# Patient Record
Sex: FEMALE | Race: BLACK OR AFRICAN AMERICAN | NOT HISPANIC OR LATINO | Employment: OTHER | URBAN - METROPOLITAN AREA
[De-identification: names, ages, dates, MRNs, and addresses within clinical notes are randomized per-mention and may not be internally consistent; named-entity substitution may affect disease eponyms.]

---

## 2023-09-30 ENCOUNTER — APPOINTMENT (OUTPATIENT)
Dept: RADIOLOGY | Facility: HOSPITAL | Age: 38
End: 2023-09-30
Payer: MEDICAID

## 2023-09-30 ENCOUNTER — HOSPITAL ENCOUNTER (EMERGENCY)
Facility: HOSPITAL | Age: 38
Discharge: HOME | End: 2023-10-01
Attending: STUDENT IN AN ORGANIZED HEALTH CARE EDUCATION/TRAINING PROGRAM
Payer: MEDICAID

## 2023-09-30 VITALS
DIASTOLIC BLOOD PRESSURE: 77 MMHG | RESPIRATION RATE: 20 BRPM | OXYGEN SATURATION: 100 % | WEIGHT: 205.03 LBS | SYSTOLIC BLOOD PRESSURE: 118 MMHG | TEMPERATURE: 97.9 F | BODY MASS INDEX: 37.5 KG/M2 | HEART RATE: 65 BPM

## 2023-09-30 DIAGNOSIS — M25.571 ACUTE BILATERAL ANKLE PAIN: ICD-10-CM

## 2023-09-30 DIAGNOSIS — M25.572 ACUTE BILATERAL ANKLE PAIN: ICD-10-CM
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
## 2023-09-30 DIAGNOSIS — M25.551 RIGHT HIP PAIN: ICD-10-CM

## 2023-09-30 PROCEDURE — 73552 X-RAY EXAM OF FEMUR 2/>: CPT | Mod: RIGHT SIDE | Performed by: STUDENT IN AN ORGANIZED HEALTH CARE EDUCATION/TRAINING PROGRAM

## 2023-09-30 PROCEDURE — 73502 X-RAY EXAM HIP UNI 2-3 VIEWS: CPT | Mod: RIGHT SIDE | Performed by: STUDENT IN AN ORGANIZED HEALTH CARE EDUCATION/TRAINING PROGRAM

## 2023-09-30 PROCEDURE — 99284 EMERGENCY DEPT VISIT MOD MDM: CPT | Performed by: STUDENT IN AN ORGANIZED HEALTH CARE EDUCATION/TRAINING PROGRAM

## 2023-09-30 PROCEDURE — 73502 X-RAY EXAM HIP UNI 2-3 VIEWS: CPT | Mod: RT,FY

## 2023-09-30 PROCEDURE — 73610 X-RAY EXAM OF ANKLE: CPT | Mod: RIGHT SIDE | Performed by: STUDENT IN AN ORGANIZED HEALTH CARE EDUCATION/TRAINING PROGRAM

## 2023-09-30 PROCEDURE — 73610 X-RAY EXAM OF ANKLE: CPT | Mod: LT,FY

## 2023-09-30 PROCEDURE — 2500000001 HC RX 250 WO HCPCS SELF ADMINISTERED DRUGS (ALT 637 FOR MEDICARE OP)

## 2023-09-30 PROCEDURE — 72170 X-RAY EXAM OF PELVIS: CPT | Mod: FY

## 2023-09-30 PROCEDURE — 73552 X-RAY EXAM OF FEMUR 2/>: CPT | Mod: LT

## 2023-09-30 PROCEDURE — 73610 X-RAY EXAM OF ANKLE: CPT | Mod: RT,FY

## 2023-09-30 RX ORDER — ACETAMINOPHEN 325 MG/1
TABLET ORAL
Status: COMPLETED
Start: 2023-09-30 | End: 2023-09-30

## 2023-09-30 RX ORDER — ACETAMINOPHEN 325 MG/1
650 TABLET ORAL ONCE
Status: COMPLETED | OUTPATIENT
Start: 2023-09-30 | End: 2023-09-30

## 2023-09-30 RX ADMIN — ACETAMINOPHEN 650 MG: 325 TABLET ORAL at 21:55

## 2023-09-30 RX ADMIN — ACETAMINOPHEN 650 MG: 325 TABLET, FILM COATED ORAL at 21:55

## 2023-09-30 ASSESSMENT — PAIN - FUNCTIONAL ASSESSMENT: PAIN_FUNCTIONAL_ASSESSMENT: 0-10

## 2023-09-30 ASSESSMENT — PAIN SCALES - GENERAL: PAINLEVEL_OUTOF10: 10 - WORST POSSIBLE PAIN

## 2023-09-30 ASSESSMENT — COLUMBIA-SUICIDE SEVERITY RATING SCALE - C-SSRS
2. HAVE YOU ACTUALLY HAD ANY THOUGHTS OF KILLING YOURSELF?: NO
6. HAVE YOU EVER DONE ANYTHING, STARTED TO DO ANYTHING, OR PREPARED TO DO ANYTHING TO END YOUR LIFE?: NO
1. IN THE PAST MONTH, HAVE YOU WISHED YOU WERE DEAD OR WISHED YOU COULD GO TO SLEEP AND NOT WAKE UP?: NO

## 2023-10-01 RX ORDER — ACETAMINOPHEN 500 MG
1000 TABLET ORAL EVERY 8 HOURS PRN
Qty: 18 TABLET | Refills: 0 | Status: SHIPPED | OUTPATIENT
Start: 2023-10-01 | End: 2023-10-04

## 2023-10-01 NOTE — ED PROVIDER NOTES
HPI   Chief Complaint   Patient presents with    Fall     Right foot pain and left leg pain, -LOC, -thinners       38-year-old female with past medical history of impaired hearing presents the ED today for chief concern of fall.  Patient states she was walking outside when she simply tripped over some uneven sidewalk and fell.  She did not hit her head or lose consciousness.  She was able to get back up after.  She states after the fall she has bilateral ankle pain, right hip pain, and left femur pain.  She has not taken any medicines for her pain at home.  States the pain is worse with movement.  She is able to ambulate.  Denies any fever/chills, nausea/vomiting.  Denies any back pain.  She is not anticoagulated.  She did not sustain any other injuries.  No other concerns at this time.      History provided by:  Patient   used: Yes (Sign language)                        No data recorded                Patient History   Past Medical History:   Diagnosis Date    Body mass index (BMI) 38.0-38.9, adult 07/15/2019    BMI 38.0-38.9,adult    Encounter for immunization 11/05/2021    Need for prophylactic vaccination and inoculation against influenza    Noninfective gastroenteritis and colitis, unspecified 02/18/2016    Acute gastroenteritis    Pain in left shoulder 11/20/2019    Left shoulder pain    Pain in unspecified knee 11/20/2019    Acute knee pain    Personal history of other diseases of the respiratory system 05/08/2014    History of upper respiratory infection    Personal history of other diseases of the respiratory system 11/20/2013    History of upper respiratory infection    Personal history of other specified conditions 07/11/2018    History of abdominal pain    Retained wood fragments 03/12/2015    Retained wood splinter     Past Surgical History:   Procedure Laterality Date    CT HEAD ANGIO W AND WO IV CONTRAST  7/7/2020    CT HEAD ANGIO W AND WO IV CONTRAST 7/7/2020 [facility] [place] [facility]     CT NECK ANGIO W AND WO IV CONTRAST  7/7/2020    CT NECK ANGIO W AND WO IV CONTRAST 7/7/2020 [facility] [place] [facility]    OTHER SURGICAL HISTORY  02/04/2013    Tracheostomy    OTHER SURGICAL HISTORY  02/20/2013    Tracheostomy     No family history on file.  Social History     Tobacco Use    Smoking status: Not on file    Smokeless tobacco: Not on file   Substance Use Topics    Alcohol use: Not on file    Drug use: Not on file       Physical Exam   ED Triage Vitals [09/30/23 2104]   Temp Heart Rate Resp BP   36.6 °C (97.9 °F) 65 20 118/77      SpO2 Temp Source Heart Rate Source Patient Position   100 % Temporal Monitor --      BP Location FiO2 (%)     Left arm --       Physical Exam  Constitutional:       Appearance: Normal appearance.   HENT:      Head: Normocephalic and atraumatic.      Nose: Nose normal.      Mouth/Throat:      Mouth: Mucous membranes are moist.      Pharynx: No oropharyngeal exudate or posterior oropharyngeal erythema.   Eyes:      General: No scleral icterus.        Right eye: No discharge.         Left eye: No discharge.      Extraocular Movements: Extraocular movements intact.      Conjunctiva/sclera: Conjunctivae normal.      Pupils: Pupils are equal, round, and reactive to light.   Neck:      Vascular: No carotid bruit.   Cardiovascular:      Rate and Rhythm: Normal rate and regular rhythm.      Pulses: Normal pulses.      Heart sounds: No murmur heard.     No friction rub. No gallop.   Pulmonary:      Effort: Pulmonary effort is normal. No respiratory distress.      Breath sounds: Normal breath sounds. No stridor. No wheezing, rhonchi or rales.   Abdominal:      General: Abdomen is flat. There is no distension.      Palpations: Abdomen is soft. There is no mass.      Tenderness: There is no abdominal tenderness. There is no guarding or rebound.   Musculoskeletal:      Cervical back: Normal range of motion and neck supple.      Comments: Full active and passive range of motion of the ankles  bilaterally.  Minimal tenderness palpation over the bilateral medial malleolus.  No tenderness palpation over the bilateral lateral malleolus, navicular, or base the fifth metatarsal.  Proximal fibula bilaterally unremarkable.  Full range of motion of the knees bilaterally.  Full range of motion of the right hip.  No focal bony tenderness palpation of the right hip.  The left femur has a quarter shaped contusion 2 inches proximal to the left knee.  Minimally tender over the area.  2+ equal dorsalis pedis pulses bilaterally.  5/5 strength in the lower extremities bilaterally.  Sensation is intact in the lower extremities bilaterally.  Neurovascular status intact.  Compartments are soft.  No cyanosis.  No calf tenderness.   Lymphadenopathy:      Cervical: No cervical adenopathy.   Skin:     General: Skin is warm.      Capillary Refill: Capillary refill takes less than 2 seconds.      Findings: No rash.   Neurological:      General: No focal deficit present.      Mental Status: She is alert and oriented to person, place, and time.      Cranial Nerves: No cranial nerve deficit.      Sensory: No sensory deficit.      Motor: No weakness.      Coordination: Coordination normal.      Gait: Gait normal.      Deep Tendon Reflexes: Reflexes normal.   Psychiatric:         Mood and Affect: Mood normal.         Behavior: Behavior normal.         Thought Content: Thought content normal.         Judgment: Judgment normal.         ED Course & MDM   ED Course as of 10/01/23 0108   Sun Oct 01, 2023   0018 XR ankle left 3+ views []   0018 XR ankle right 3+ views [MC]   0018 XR hip right 2 or 3 views [MC]   0018 XR pelvis 1-2 views []      ED Course User Index  [MC] August Petit PA-C         Diagnoses as of 10/01/23 0108   Fall, initial encounter   Acute bilateral ankle pain   Right hip pain       Medical Decision Making  38-year-old female with past medical history of impaired hearing presents to the ED today for chief concern of  fall.  Vital signs are reassuring.  Patient overall appears well and is nontoxic-appearing.  She was given Tylenol in the emergency department. X-ray of pelvis, right hip, left femur, left ankle, right ankle shows no acute osseous abnormality.  The bilateral hip dysplasia more so on right than the left.  Asymmetric edema in soft tissues of distal legs and ankles bilaterally.  Physical exam overall reassuring.  No back pain.  Signs and symptoms likely related to contusion.  Patient was placed in ankle Ace wrap bilaterally and left ankle Aircast.  Patient requesting crutches so she was given these as well.  Signs and symptoms likely related to ankle sprain and contusion.  Discussed my impression and findings with patient and she feels comfortable returning home.  We discussed very strict return precautions including returning for any new or worsening signs or symptoms.  Patient is in agreement this plan.  She will follow-up with her primary care physician within 3 days.  Discharged with orthopedics referral.  She will follow-up with orthopedics in 3 days.   used during entire visit.  Discharged with Tylenol.    Differential diagnosis: Strain, sprain, fracture, dislocation, contusion, deep structure injury, laceration, abrasion, cellulitis, lymphangitis, crystal arthropathy, septic arthritis    She decision-making was used patient feels comfortable returning home.        Procedure  Procedures     August Petit PA-C  10/01/23 0200

## 2025-02-13 ENCOUNTER — HOSPITAL ENCOUNTER (EMERGENCY)
Facility: HOSPITAL | Age: 40
Discharge: HOME | End: 2025-02-14
Attending: EMERGENCY MEDICINE
Payer: MEDICAID

## 2025-02-13 DIAGNOSIS — B37.9 YEAST INFECTION: Primary | ICD-10-CM

## 2025-02-13 LAB
ALBUMIN SERPL BCP-MCNC: 3.9 G/DL (ref 3.4–5)
ALP SERPL-CCNC: 42 U/L (ref 33–110)
ALT SERPL W P-5'-P-CCNC: 7 U/L (ref 7–45)
ANION GAP SERPL CALC-SCNC: 9 MMOL/L (ref 10–20)
AST SERPL W P-5'-P-CCNC: 15 U/L (ref 9–39)
B-HCG SERPL-ACNC: <2 MIU/ML
BASOPHILS # BLD AUTO: 0.03 X10*3/UL (ref 0–0.1)
BASOPHILS NFR BLD AUTO: 0.4 %
BILIRUB SERPL-MCNC: 0.4 MG/DL (ref 0–1.2)
BUN SERPL-MCNC: 17 MG/DL (ref 6–23)
CALCIUM SERPL-MCNC: 9.1 MG/DL (ref 8.6–10.3)
CHLORIDE SERPL-SCNC: 100 MMOL/L (ref 98–107)
CLUE CELLS SPEC QL WET PREP: NORMAL
CO2 SERPL-SCNC: 33 MMOL/L (ref 21–32)
CREAT SERPL-MCNC: 0.91 MG/DL (ref 0.5–1.05)
EGFRCR SERPLBLD CKD-EPI 2021: 84 ML/MIN/1.73M*2
EOSINOPHIL # BLD AUTO: 0.06 X10*3/UL (ref 0–0.7)
EOSINOPHIL NFR BLD AUTO: 0.8 %
ERYTHROCYTE [DISTWIDTH] IN BLOOD BY AUTOMATED COUNT: 16.1 % (ref 11.5–14.5)
GLUCOSE SERPL-MCNC: 84 MG/DL (ref 74–99)
HCT VFR BLD AUTO: 39 % (ref 36–46)
HGB BLD-MCNC: 12.5 G/DL (ref 12–16)
IMM GRANULOCYTES # BLD AUTO: 0.03 X10*3/UL (ref 0–0.7)
IMM GRANULOCYTES NFR BLD AUTO: 0.4 % (ref 0–0.9)
LACTATE SERPL-SCNC: 0.7 MMOL/L (ref 0.4–2)
LIPASE SERPL-CCNC: 28 U/L (ref 9–82)
LYMPHOCYTES # BLD AUTO: 2.14 X10*3/UL (ref 1.2–4.8)
LYMPHOCYTES NFR BLD AUTO: 29.4 %
MCH RBC QN AUTO: 28.7 PG (ref 26–34)
MCHC RBC AUTO-ENTMCNC: 32.1 G/DL (ref 32–36)
MCV RBC AUTO: 89 FL (ref 80–100)
MONOCYTES # BLD AUTO: 0.66 X10*3/UL (ref 0.1–1)
MONOCYTES NFR BLD AUTO: 9.1 %
NEUTROPHILS # BLD AUTO: 4.35 X10*3/UL (ref 1.2–7.7)
NEUTROPHILS NFR BLD AUTO: 59.9 %
NRBC BLD-RTO: 0 /100 WBCS (ref 0–0)
PLATELET # BLD AUTO: 144 X10*3/UL (ref 150–450)
POTASSIUM SERPL-SCNC: 4.6 MMOL/L (ref 3.5–5.3)
PROT SERPL-MCNC: 7 G/DL (ref 6.4–8.2)
RBC # BLD AUTO: 4.36 X10*6/UL (ref 4–5.2)
SODIUM SERPL-SCNC: 137 MMOL/L (ref 136–145)
T VAGINALIS SPEC QL WET PREP: NORMAL
WBC # BLD AUTO: 7.3 X10*3/UL (ref 4.4–11.3)
WBC VAG QL WET PREP: NORMAL
YEAST VAG QL WET PREP: NORMAL

## 2025-02-13 PROCEDURE — 36415 COLL VENOUS BLD VENIPUNCTURE: CPT

## 2025-02-13 PROCEDURE — 84702 CHORIONIC GONADOTROPIN TEST: CPT

## 2025-02-13 PROCEDURE — 87210 SMEAR WET MOUNT SALINE/INK: CPT

## 2025-02-13 PROCEDURE — 84075 ASSAY ALKALINE PHOSPHATASE: CPT

## 2025-02-13 PROCEDURE — 85025 COMPLETE CBC W/AUTO DIFF WBC: CPT

## 2025-02-13 PROCEDURE — 83690 ASSAY OF LIPASE: CPT

## 2025-02-13 PROCEDURE — 87491 CHLMYD TRACH DNA AMP PROBE: CPT | Mod: AHULAB

## 2025-02-13 PROCEDURE — 83605 ASSAY OF LACTIC ACID: CPT

## 2025-02-13 PROCEDURE — 99283 EMERGENCY DEPT VISIT LOW MDM: CPT | Performed by: EMERGENCY MEDICINE

## 2025-02-13 ASSESSMENT — PAIN DESCRIPTION - LOCATION
LOCATION: VAGINA
LOCATION_2: ABDOMEN

## 2025-02-13 ASSESSMENT — PAIN SCALES - GENERAL: PAINLEVEL_OUTOF10: 5 - MODERATE PAIN

## 2025-02-13 ASSESSMENT — PAIN DESCRIPTION - DESCRIPTORS
DESCRIPTORS_2: PRESSURE
DESCRIPTORS: BURNING

## 2025-02-13 ASSESSMENT — PAIN DESCRIPTION - ORIENTATION
ORIENTATION_2: RIGHT;LEFT;LOWER
ORIENTATION: MID

## 2025-02-13 ASSESSMENT — PAIN - FUNCTIONAL ASSESSMENT: PAIN_FUNCTIONAL_ASSESSMENT: 0-10

## 2025-02-13 ASSESSMENT — PAIN DESCRIPTION - FREQUENCY: FREQUENCY: CONSTANT/CONTINUOUS

## 2025-02-13 ASSESSMENT — PAIN DESCRIPTION - PAIN TYPE: TYPE: ACUTE PAIN

## 2025-02-13 NOTE — ED TRIAGE NOTES
Patient c/o yellow vaginal discharge and burning with urination that started at this beginning of the new year. Pt also states lower abdominal pain. Pt denies n/v/d

## 2025-02-13 NOTE — ED TRIAGE NOTES
TRIAGE NOTE   I saw the patient as the Clinician in Triage and performed a brief history and physical exam, established acuity, and ordered appropriate tests to develop basic plan of care. Patient will be seen by an [facility], resident and/or physician who will independently evaluate the patient. Please see subsequent provider notes for further details and disposition.     Brief HPI: In brief, Sumanth Aragon is a 39 y.o. female that presents for lower abdominal pain, vaginal discharge, and urinary symptoms for about 1 month.  No fevers.  No nausea vomiting or diarrhea.  Is sexually active.  No other symptoms or concerns at this time.    Focused Physical exam:   Mild lower abdominal tenderness.  No rebound tenderness or guarding.  No palpable mass.  No pulsatile masses.  Plan/MDM:   Initiating basic labs and STDs test.  Patient will be seen in the back of the ED for further evaluation and treatment.  I will not be phone this patient during her ED visit.  This is a preliminary evaluation during triage.    I evaluated this patient in triage with the RN. Due to the patients complaint labs and or imaging were ordered by myself in an attempt to expedite patient care however I am not participating in care after evaluation. This is a preliminary assessment. Pt does not appear in acute distress at this time. They will have a full evaluation as soon as possible. They will be cared for by another provider who will possibly order more labs, imaging or interventions. Pt did not have a full ROS or PE completed by myself however below is a summary with reasons for orders.  For the remainder of the patient's workup and ED course, please refer to the main ED provider note. We discussed need for diagnostic testing including laboratory studies and imaging.  We also discussed that patient may be asked to wait in the waiting room while these tests are pending.  They understand that if they choose to leave without having the testing completed  or resulted that we cannot rule out acute life threatening illnesses and the risks involved could lead to worsening condition, permanent disability or even death.     Please see subsequent provider note for further details and disposition

## 2025-02-14 VITALS
HEIGHT: 62 IN | SYSTOLIC BLOOD PRESSURE: 120 MMHG | TEMPERATURE: 98.2 F | DIASTOLIC BLOOD PRESSURE: 84 MMHG | OXYGEN SATURATION: 95 % | RESPIRATION RATE: 16 BRPM | HEART RATE: 72 BPM | BODY MASS INDEX: 33.13 KG/M2 | WEIGHT: 180 LBS

## 2025-02-14 LAB
APPEARANCE UR: CLEAR
BACTERIA #/AREA URNS AUTO: ABNORMAL /HPF
BILIRUB UR STRIP.AUTO-MCNC: NEGATIVE MG/DL
C TRACH RRNA SPEC QL NAA+PROBE: NEGATIVE
COLOR UR: COLORLESS
GLUCOSE UR STRIP.AUTO-MCNC: NORMAL MG/DL
KETONES UR STRIP.AUTO-MCNC: NEGATIVE MG/DL
LEUKOCYTE ESTERASE UR QL STRIP.AUTO: NEGATIVE
N GONORRHOEA DNA SPEC QL PROBE+SIG AMP: NEGATIVE
NITRITE UR QL STRIP.AUTO: NEGATIVE
PH UR STRIP.AUTO: 5.5 [PH]
PROT UR STRIP.AUTO-MCNC: NEGATIVE MG/DL
RBC # UR STRIP.AUTO: ABNORMAL MG/DL
RBC #/AREA URNS AUTO: ABNORMAL /HPF
SP GR UR STRIP.AUTO: 1
SQUAMOUS #/AREA URNS AUTO: ABNORMAL /HPF
UROBILINOGEN UR STRIP.AUTO-MCNC: NORMAL MG/DL
WBC #/AREA URNS AUTO: ABNORMAL /HPF

## 2025-02-14 PROCEDURE — 81001 URINALYSIS AUTO W/SCOPE: CPT

## 2025-02-14 PROCEDURE — 2500000001 HC RX 250 WO HCPCS SELF ADMINISTERED DRUGS (ALT 637 FOR MEDICARE OP): Performed by: EMERGENCY MEDICINE

## 2025-02-14 RX ORDER — FLUCONAZOLE 100 MG/1
150 TABLET ORAL ONCE
Status: COMPLETED | OUTPATIENT
Start: 2025-02-14 | End: 2025-02-14

## 2025-02-14 RX ADMIN — FLUCONAZOLE 150 MG: 100 TABLET ORAL at 01:45

## 2025-02-14 NOTE — ED PROVIDER NOTES
HPI   Chief Complaint   Patient presents with    Vaginal Discharge    Abdominal Pain       This is a 39-year-old woman with past medical history of deafness who does present with complaint of dysuria for 1 month.  Intermittent.  She is concerned for possible UTI versus yeast infection.  She denies any abdominal pain.  Denies any chest pain.  No fever reported.  Tolerating p.o. normally            Patient History   Past Medical History:   Diagnosis Date    Body mass index (BMI) 38.0-38.9, adult 07/15/2019    BMI 38.0-38.9,adult    Encounter for immunization 11/05/2021    Need for prophylactic vaccination and inoculation against influenza    Noninfective gastroenteritis and colitis, unspecified 02/18/2016    Acute gastroenteritis    Pain in left shoulder 11/20/2019    Left shoulder pain    Pain in unspecified knee 11/20/2019    Acute knee pain    Personal history of other diseases of the respiratory system 05/08/2014    History of upper respiratory infection    Personal history of other diseases of the respiratory system 11/20/2013    History of upper respiratory infection    Personal history of other specified conditions 07/11/2018    History of abdominal pain    Retained wood fragments 03/12/2015    Retained wood splinter     Past Surgical History:   Procedure Laterality Date    CT ANGIO NECK W AND WO IV CONTRAST  7/7/2020    CT NECK ANGIO W AND WO IV CONTRAST 7/7/2020 [place] [facility]    CT HEAD ANGIO W AND WO IV CONTRAST  7/7/2020    CT HEAD ANGIO W AND WO IV CONTRAST 7/7/2020 [facility]    OTHER SURGICAL HISTORY  02/04/2013    Tracheostomy    OTHER SURGICAL HISTORY  02/20/2013    Tracheostomy     No family history on file.  Social History     Tobacco Use    Smoking status: Not on file    Smokeless tobacco: Not on file   Substance Use Topics    Alcohol use: Not on file    Drug use: Not on file       Physical Exam   ED Triage Vitals   Temperature Heart Rate Respirations BP   02/13/25 1651 02/13/25  1651 02/13/25 1651 02/13/25 1651   37.1 °C (98.8 °F) 60 16 118/75      Pulse Ox Temp Source Heart Rate Source Patient Position   02/13/25 1651 02/13/25 1651 02/13/25 1651 --   97 % Temporal Monitor       BP Location FiO2 (%)     02/13/25 1922 --     Left arm        Physical Exam  Vitals and nursing note reviewed.   Constitutional:       General: She is not in acute distress.     Appearance: She is well-developed. She is obese. She is not ill-appearing.   HENT:      Head: Normocephalic and atraumatic.      Mouth/Throat:      Mouth: Mucous membranes are moist.   Eyes:      Extraocular Movements: Extraocular movements intact.      Pupils: Pupils are equal, round, and reactive to light.   Cardiovascular:      Rate and Rhythm: Normal rate and regular rhythm.      Heart sounds: Normal heart sounds.   Pulmonary:      Effort: Pulmonary effort is normal.      Breath sounds: Normal breath sounds.   Abdominal:      General: Abdomen is flat. Bowel sounds are normal.      Palpations: Abdomen is soft.      Tenderness: There is no abdominal tenderness. There is no right CVA tenderness, left CVA tenderness, guarding or rebound. Negative signs include Coughlin's sign.   Skin:     General: Skin is warm and dry.      Capillary Refill: Capillary refill takes less than 2 seconds.      Coloration: Skin is not jaundiced or mottled.   Neurological:      General: No focal deficit present.      Mental Status: She is alert and oriented to person, place, and time.   Psychiatric:         Mood and Affect: Mood normal.         Behavior: Behavior normal.           ED Course & MDM   Diagnoses as of 02/14/25 0134   Yeast infection                 No data recorded     Emma Coma Scale Score: 15 (02/13/25 1649 : Vignesh Sigala, SHARON)                           Medical Decision Making  Ua negative for infection. Pregnancy test negative. Patient's lipase negative for pancreatitis.  Clue cells trichomonas yeast and white blood cells were all negative on the  swab.  Her gonorrhea chlamydia swab is pending.  There was no acute kidney injury.  She is tolerating p.o. here in the ED.  She does not have any abdominal tenderness throughout.  She is concerned she does have a yeast infection though her swab was negative I did provide her with a Diflucan and patient will follow-up with outpatient provider.  Return precautions are discussed.    Amount and/or Complexity of Data Reviewed  Labs: ordered. Decision-making details documented in ED Course.        Procedure  Procedures     Yovani Davison MD  02/14/25 [id number]

## 2025-02-14 NOTE — DISCHARGE INSTRUCTIONS
Please take the Diflucan as prescribed treat for yeast infection.  Please 90 for worsening pain, fever, vomiting or any other concerning symptoms.

## 2025-04-01 ENCOUNTER — APPOINTMENT (OUTPATIENT)
Dept: PRIMARY CARE | Facility: CLINIC | Age: 40
End: 2025-04-01
Payer: MEDICAID

## 2025-04-29 ENCOUNTER — APPOINTMENT (OUTPATIENT)
Dept: CARDIOLOGY | Facility: CLINIC | Age: 40
End: 2025-04-29
Payer: MEDICAID

## 2025-04-29 PROBLEM — R05.8 NONPRODUCTIVE COUGH: Status: ACTIVE | Noted: 2025-04-29

## 2025-04-29 PROBLEM — R93.1 ABNORMAL ECHOCARDIOGRAPHY: Status: ACTIVE | Noted: 2025-04-29

## 2025-04-29 PROBLEM — I42.9 CARDIOMYOPATHY: Status: ACTIVE | Noted: 2018-06-13

## 2025-04-29 PROBLEM — M25.519 SHOULDER PAIN: Status: ACTIVE | Noted: 2024-06-02

## 2025-04-29 PROBLEM — Z86.59 HISTORY OF POSTTRAUMATIC STRESS DISORDER (PTSD): Status: ACTIVE | Noted: 2025-04-29

## 2025-04-29 PROBLEM — F60.3: Status: ACTIVE | Noted: 2021-06-03

## 2025-04-29 PROBLEM — R53.81 MALAISE AND FATIGUE: Status: ACTIVE | Noted: 2024-06-02

## 2025-04-29 PROBLEM — E03.9 HYPOTHYROIDISM: Status: ACTIVE | Noted: 2025-04-29

## 2025-04-29 PROBLEM — M54.2 NECK PAIN: Status: ACTIVE | Noted: 2024-06-02

## 2025-04-29 PROBLEM — F17.200 NICOTINE USE DISORDER: Status: ACTIVE | Noted: 2024-02-10

## 2025-04-29 PROBLEM — R41.83 BORDERLINE INTELLECTUAL DISABILITY: Status: ACTIVE | Noted: 2021-06-03

## 2025-04-29 PROBLEM — J30.2 SEASONAL ALLERGIES: Status: ACTIVE | Noted: 2022-05-11

## 2025-04-29 PROBLEM — R06.09 DYSPNEA ON EXERTION: Status: ACTIVE | Noted: 2024-06-02
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
## 2025-04-29 PROBLEM — F12.10 CANNABIS ABUSE: Status: ACTIVE | Noted: 2021-08-23

## 2025-04-29 PROBLEM — I10 PRIMARY HYPERTENSION: Status: ACTIVE | Noted: 2022-05-06

## 2025-04-29 PROBLEM — H91.90 HEARING LOSS: Status: ACTIVE | Noted: 2024-06-02

## 2025-04-29 PROBLEM — H90.3 SENSORINEURAL HEARING LOSS (SNHL) OF BOTH EARS: Status: ACTIVE | Noted: 2018-06-13

## 2025-04-29 PROBLEM — K21.9 GASTROESOPHAGEAL REFLUX DISEASE: Status: ACTIVE | Noted: 2024-06-02

## 2025-04-29 PROBLEM — F41.1 GENERALIZED ANXIETY DISORDER: Status: ACTIVE | Noted: 2021-08-23

## 2025-04-29 PROBLEM — G47.00 INSOMNIA: Status: ACTIVE | Noted: 2024-11-13

## 2025-04-29 PROBLEM — R53.83 MALAISE AND FATIGUE: Status: ACTIVE | Noted: 2024-06-02

## 2025-04-29 PROBLEM — G47.33 OBSTRUCTIVE SLEEP APNEA SYNDROME: Status: ACTIVE | Noted: 2024-06-02

## 2025-04-29 PROBLEM — M54.10 RADICULAR LEG PAIN: Status: ACTIVE | Noted: 2024-06-02

## 2025-04-29 PROBLEM — S93.409A SPRAIN OF ANKLE: Status: ACTIVE | Noted: 2024-06-02

## 2025-04-29 PROBLEM — E66.811 OBESITY, CLASS I, BMI 30-34.9: Status: ACTIVE | Noted: 2018-06-13

## 2025-04-29 PROBLEM — J44.9 SUSPECTED CHRONIC OBSTRUCTIVE PULMONARY DISEASE BASED ON INITIAL EVALUATION (MULTI): Status: ACTIVE | Noted: 2025-04-29

## 2025-04-29 PROBLEM — J45.41 MODERATE PERSISTENT ASTHMA WITH ACUTE EXACERBATION (HHS-HCC): Status: ACTIVE | Noted: 2023-07-04

## 2025-04-29 PROBLEM — I50.33 ACUTE ON CHRONIC HEART FAILURE WITH PRESERVED EJECTION FRACTION: Status: ACTIVE | Noted: 2023-07-09

## 2025-04-30 ENCOUNTER — OFFICE VISIT (OUTPATIENT)
Dept: CARDIOLOGY | Facility: CLINIC | Age: 40
End: 2025-04-30
Payer: MEDICAID

## 2025-04-30 VITALS
DIASTOLIC BLOOD PRESSURE: 74 MMHG | HEART RATE: 77 BPM | OXYGEN SATURATION: 99 % | SYSTOLIC BLOOD PRESSURE: 119 MMHG | BODY MASS INDEX: 38.41 KG/M2 | WEIGHT: 210 LBS

## 2025-04-30 DIAGNOSIS — R06.02 SHORTNESS OF BREATH: ICD-10-CM

## 2025-04-30 DIAGNOSIS — E78.2 MIXED HYPERLIPIDEMIA: ICD-10-CM

## 2025-04-30 DIAGNOSIS — I42.9 CARDIOMYOPATHY, UNSPECIFIED TYPE (MULTI): Primary | ICD-10-CM

## 2025-04-30 DIAGNOSIS — R07.9 CHEST PAIN, UNSPECIFIED TYPE: ICD-10-CM

## 2025-04-30 DIAGNOSIS — G47.33 OSA (OBSTRUCTIVE SLEEP APNEA): ICD-10-CM

## 2025-04-30 DIAGNOSIS — I10 PRIMARY HYPERTENSION: ICD-10-CM

## 2025-04-30 DIAGNOSIS — I50.32 CHRONIC HEART FAILURE WITH PRESERVED EJECTION FRACTION: ICD-10-CM

## 2025-04-30 PROCEDURE — 99202 OFFICE O/P NEW SF 15 MIN: CPT

## 2025-04-30 PROCEDURE — 3074F SYST BP LT 130 MM HG: CPT

## 2025-04-30 PROCEDURE — 3078F DIAST BP <80 MM HG: CPT

## 2025-04-30 PROCEDURE — 99205 OFFICE O/P NEW HI 60 MIN: CPT

## 2025-04-30 RX ORDER — DICLOFENAC SODIUM 10 MG/G
4 GEL TOPICAL 3 TIMES DAILY PRN
COMMUNITY
Start: 2021-03-16

## 2025-04-30 RX ORDER — FUROSEMIDE 20 MG/1
20 TABLET ORAL
COMMUNITY
Start: 2018-08-08

## 2025-04-30 RX ORDER — CETIRIZINE HYDROCHLORIDE 10 MG/1
10 TABLET ORAL
COMMUNITY
Start: 2018-01-15

## 2025-04-30 RX ORDER — CLONAZEPAM 0.5 MG/1
0.5 TABLET ORAL
COMMUNITY

## 2025-04-30 RX ORDER — DOXYCYCLINE 100 MG/1
CAPSULE ORAL
COMMUNITY
Start: 2024-10-12

## 2025-04-30 RX ORDER — TRAZODONE HYDROCHLORIDE 150 MG/1
TABLET ORAL
COMMUNITY
Start: 2025-04-28

## 2025-04-30 RX ORDER — ALBUTEROL SULFATE 0.83 MG/ML
2.5 SOLUTION RESPIRATORY (INHALATION) EVERY 4 HOURS PRN
COMMUNITY
Start: 2024-03-01

## 2025-04-30 RX ORDER — GUAIFENESIN 600 MG/1
600 TABLET, EXTENDED RELEASE ORAL EVERY 12 HOURS PRN
COMMUNITY
Start: 2024-03-01

## 2025-04-30 RX ORDER — LEVOTHYROXINE SODIUM 25 UG/1
25 TABLET ORAL
COMMUNITY
Start: 2013-03-13

## 2025-04-30 RX ORDER — CEPHALEXIN 500 MG/1
CAPSULE ORAL
COMMUNITY
Start: 2023-07-22

## 2025-04-30 RX ORDER — ALBUTEROL SULFATE 90 UG/1
2 INHALANT RESPIRATORY (INHALATION) EVERY 4 HOURS PRN
COMMUNITY
Start: 2024-03-01

## 2025-04-30 RX ORDER — CEFUROXIME AXETIL 500 MG/1
1 TABLET ORAL
COMMUNITY
Start: 2024-10-12

## 2025-04-30 RX ORDER — ACETAMINOPHEN 500 MG
500 TABLET ORAL EVERY 8 HOURS PRN
COMMUNITY
Start: 2025-04-22

## 2025-04-30 RX ORDER — DIPHENHYDRAMINE HCL 25 MG
25-50 CAPSULE ORAL DAILY PRN
COMMUNITY
Start: 2023-10-30

## 2025-04-30 RX ORDER — MONTELUKAST SODIUM 10 MG/1
10 TABLET ORAL
COMMUNITY
Start: 2024-03-01

## 2025-04-30 RX ORDER — PETROLATUM,WHITE 41 %
1 OINTMENT (GRAM) TOPICAL 2 TIMES DAILY
COMMUNITY

## 2025-04-30 RX ORDER — ALBUTEROL SULFATE 90 UG/1
INHALANT RESPIRATORY (INHALATION)
COMMUNITY
Start: 2017-01-31

## 2025-04-30 RX ORDER — ARIPIPRAZOLE LAUROXIL 662 MG/2.4ML
662 INJECTION, SUSPENSION, EXTENDED RELEASE INTRAMUSCULAR
COMMUNITY
Start: 2018-05-10

## 2025-04-30 RX ORDER — CYCLOBENZAPRINE HCL 10 MG
10 TABLET ORAL EVERY 8 HOURS PRN
COMMUNITY
Start: 2024-05-15

## 2025-04-30 RX ORDER — FLUTICASONE PROPIONATE 110 UG/1
2 AEROSOL, METERED RESPIRATORY (INHALATION) 2 TIMES DAILY
COMMUNITY
Start: 2013-11-20

## 2025-04-30 RX ORDER — ONDANSETRON 4 MG/1
TABLET, ORALLY DISINTEGRATING ORAL
COMMUNITY
Start: 2016-05-02

## 2025-04-30 RX ORDER — TIOTROPIUM BROMIDE 18 UG/1
CAPSULE ORAL; RESPIRATORY (INHALATION)
COMMUNITY
Start: 2021-03-16

## 2025-04-30 RX ORDER — VIT C/E/ZN/COPPR/LUTEIN/ZEAXAN 250MG-90MG
1000 CAPSULE ORAL
COMMUNITY
Start: 2024-08-16

## 2025-04-30 RX ORDER — DIVALPROEX SODIUM 250 MG/1
250 TABLET, DELAYED RELEASE ORAL NIGHTLY
COMMUNITY
Start: 2025-04-21

## 2025-04-30 RX ORDER — CARVEDILOL 3.12 MG/1
3.12 TABLET ORAL 2 TIMES DAILY
COMMUNITY
Start: 2013-06-18

## 2025-04-30 RX ORDER — IBUPROFEN 400 MG/1
TABLET ORAL
COMMUNITY
Start: 2014-10-28

## 2025-04-30 RX ORDER — FLUTICASONE PROPIONATE 50 MCG
SPRAY, SUSPENSION (ML) NASAL
COMMUNITY
Start: 2018-07-11

## 2025-04-30 RX ORDER — FLUOXETINE HYDROCHLORIDE 20 MG/1
40 CAPSULE ORAL
COMMUNITY
Start: 2024-03-02

## 2025-04-30 RX ORDER — NAPROXEN 500 MG/1
TABLET ORAL
COMMUNITY
Start: 2024-11-18

## 2025-04-30 RX ORDER — PREDNISONE 10 MG/1
TABLET ORAL
COMMUNITY
Start: 2024-10-12

## 2025-04-30 RX ORDER — BUSPIRONE HYDROCHLORIDE 15 MG/1
1 TABLET ORAL 2 TIMES DAILY
COMMUNITY
Start: 2023-05-16

## 2025-04-30 ASSESSMENT — ENCOUNTER SYMPTOMS
DEPRESSION: 1
LOSS OF SENSATION IN FEET: 1
OCCASIONAL FEELINGS OF UNSTEADINESS: 1

## 2025-04-30 NOTE — PATIENT INSTRUCTIONS
Echocardiogram to evaluate heart function.    Labs.    Daily weights.    Low sodium diet.     Follow up with Dr. Moreno in 3 months.

## 2025-04-30 NOTE — PROGRESS NOTES
Referred by   for establishment of care     History Of Present Illness:    Sumanth Aragon is a 39 y.o. female with PMHx of cardiomyopathy, HFpEF, HTN, MANZO, COPD, LUDA, insomnia, GERD, generalized anxiety disorder, pression, explosive personality disorder, hypothyroidism, and cannabis abuse presenting today for establishment of care.    Intermittent chest pressure occurring daily, at rest and with activity, however is worse with activity. She does admit to having SOB and palpitations.     She denies any lightheadedness, syncope, orthopnea, PND, lower extremity edema.      Past Medical History:  She has a past medical history of Body mass index (BMI) 38.0-38.9, adult (07/15/2019), Encounter for immunization (11/05/2021), Noninfective gastroenteritis and colitis, unspecified (02/18/2016), Pain in left shoulder (11/20/2019), Pain in unspecified knee (11/20/2019), Personal history of other diseases of the respiratory system (05/08/2014), Personal history of other diseases of the respiratory system (11/20/2013), Personal history of other specified conditions (07/11/2018), and Retained wood fragments (03/12/2015).    Past Surgical History:  She has a past surgical history that includes Other surgical history (02/04/2013); Other surgical history (02/20/2013); CT angio neck (7/7/2020); and CT angio head w and wo IV contrast (7/7/2020).      Social History:  She has no history on file for tobacco use, alcohol use, and drug use.    Family History:  Family History[1]     Allergies:  Patient has no known allergies.    Outpatient Medications:  No current outpatient medications     Last Recorded Vitals:  There were no vitals filed for this visit.    Physical Exam:  General: no acute distress  HEENT: EOMI, no scleral icterus.  Lungs: Clear to auscultation bilaterally without wheezing, rales, or rhonchi.  Cardiovascular: Regular rhythm and rate. Normal S1 and S2. No murmurs, rubs, or gallops are appreciated. JVP normal.  Abdomen:  Soft, nontender, nondistended. Bowel sounds present.  Extremities: Warm and well perfused with equal 2+ pulses bilaterally.  No edema.  Neurologic: Alert and oriented x3.      Last Labs:  CBC -  Lab Results   Component Value Date    WBC 7.3 02/13/2025    HGB 12.5 02/13/2025    HCT 39.0 02/13/2025    MCV 89 02/13/2025     (L) 02/13/2025     CMP -  Lab Results   Component Value Date    CALCIUM 9.1 02/13/2025    PROT 7.0 02/13/2025    ALBUMIN 3.9 02/13/2025    AST 15 02/13/2025    ALT 7 02/13/2025    ALKPHOS 42 02/13/2025    BILITOT 0.4 02/13/2025     LIPID PANEL -   Lab Results   Component Value Date    CHOL 192 03/16/2021    TRIG 111 03/16/2021    HDL 39.9 (A) 03/16/2021    CHHDL 4.8 03/16/2021    LDLF 130 (H) 03/16/2021    VLDL 22 03/16/2021    NHDL 139 10/06/2020     RENAL FUNCTION PANEL -   Lab Results   Component Value Date    GLUCOSE 84 02/13/2025     02/13/2025    K 4.6 02/13/2025     02/13/2025    CO2 33 (H) 02/13/2025    ANIONGAP 9 (L) 02/13/2025    BUN 17 02/13/2025    CREATININE 0.91 02/13/2025    CALCIUM 9.1 02/13/2025    ALBUMIN 3.9 02/13/2025      Lab Results   Component Value Date    BNP 52 06/12/2019    HGBA1C 5.5 06/14/2023     Last Cardiology Tests:  ECG:  No results found for this or any previous visit from the past 1095 days.    Echo:  TTE 11/20/2020   1. Poorly visualized anatomical structures due to suboptimal image quality.   2. The left ventricular systolic function is not well visualized.   3. LV endocardial border is not well defined. The LVEF is probably low normal (LVEF ~ 50%). Unable to obtain IV access to administer ultrasound enhancing agent despite multiple attempts.   4. Mild to moderate mitral valve regurgitation.   5. Consider cardiac MRI for accurate assessment of ventricular function.    TTE 12/2/2015   1. The left ventricular systolic function is low normal with a 50-55% ejection fraction.   2. The left atrium is mildly dilated    Cath:  No results found for  this or any previous visit from the past 1095 days.    Stress Test:  Echo stress test 2/9/2024  Using the phased array probe, the heart was imaged in a apical, parasternal and subcostal view.  Pericardial Effusion was absent.   Cardiac activity was detected.   Left Ventricular Function was moderately impaired.   Right Ventricular Size was Normal.   Yes still images or video images were saved for this exam..   Conclusion:   Pericardial effusion was absent.     NUCLEAR STRESS TEST 07/11/2023   1. SPECT Perfusion Study: Normal.    2. There is no scintigraphic evidence for inducible ischemia.    3. No evidence of scarred myocardium.    4. Left ventricle is normal in size. The left ventricle systolic   function is normal.    5. Right ventricle is normal in size. The right ventricle systolic   function is normal.    6. This is a low risk scan.             Gated Stress FBP Gated Rest FBP    LVEF % 62               60     Exercise stress test 12/2/2015   1. No clinical or electrocardiographic evidence for ischemia at a submaximal workload.   2. The submaximal level of stress was achieved.    Cardiac Imaging:  No results found for this or any previous visit from the past 1095 days.    I have personally reviewed most recent PCP, cardiology, vascular, studies and/or documentation.      Assessment/Plan   Cardiomyopathy, she is on furosemide 20mg daily.     HTN, well-controlled, /74 today.     Yasmin Jenkins [phone]    Follow up with Dr. Moreno.    Summer Arzola, APRHORTENCIA-CNP       [1] No family history on file.     (H) 03/16/2021    VLDL 22 03/16/2021    NHDL 139 10/06/2020     RENAL FUNCTION PANEL -   Lab Results   Component Value Date    GLUCOSE 86 05/02/2025     05/02/2025    K 4.6 05/02/2025    CL 98 05/02/2025    CO2 31 05/02/2025    ANIONGAP 10 05/02/2025    BUN 17 05/02/2025    CREATININE 1.03 (H) 05/02/2025    CALCIUM 8.9 05/02/2025    ALBUMIN 4.1 05/02/2025      Lab Results   Component Value Date     (H) 05/02/2025    HGBA1C 5.5 06/14/2023     Last Cardiology Tests:  ECG:  EKG 3/19/2025  Bradycardia with ventricular rate of 56 bpm and PACs  Borderline prolonged QT interval  No STEMI    Echo:  TTE 3/26/2024  Technically difficult study.  Left ventricular systolic function is low normal.  The left ventricular ejection fraction (LVEF) is 55% +/- 5%  Normal RV systolic function.  No hemodynamically significant valve disease.  The pulmonary artery systolic pressure could not be estimated.     TTE 11/20/2020   1. Poorly visualized anatomical structures due to suboptimal image quality.   2. The left ventricular systolic function is not well visualized.   3. LV endocardial border is not well defined. The LVEF is probably low normal (LVEF ~ 50%). Unable to obtain IV access to administer ultrasound enhancing agent despite multiple attempts.   4. Mild to moderate mitral valve regurgitation.   5. Consider cardiac MRI for accurate assessment of ventricular function.    TTE 12/2/2015   1. The left ventricular systolic function is low normal with a 50-55% ejection fraction.   2. The left atrium is mildly dilated    Cath:  No results found for this or any previous visit from the past 1095 days.    Stress Test:  Echo stress test 2/9/2024  Using the phased array probe, the heart was imaged in a apical, parasternal and subcostal view.  Pericardial Effusion was absent.   Cardiac activity was detected.   Left Ventricular Function was moderately impaired.   Right Ventricular Size was Normal.   Yes still images or video images  were saved for this exam.  Conclusion:   Pericardial effusion was absent.     NUCLEAR STRESS TEST 07/11/2023   1. SPECT Perfusion Study: Normal.    2. There is no scintigraphic evidence for inducible ischemia.    3. No evidence of scarred myocardium.    4. Left ventricle is normal in size. The left ventricle systolic   function is normal.    5. Right ventricle is normal in size. The right ventricle systolic   function is normal.    6. This is a low risk scan.             Gated Stress FBP Gated Rest FBP    LVEF % 62               60     Exercise stress test 12/2/2015   1. No clinical or electrocardiographic evidence for ischemia at a submaximal workload.   2. The submaximal level of stress was achieved.    Cardiac Imaging:  No results found for this or any previous visit from the past 1095 days.    I have personally reviewed most recent PCP, cardiology, vascular, studies and/or documentation.      Assessment/Plan   Cardiomyopathy, nonischemic, LVEF 40% in 2000 and improved to 55% in 2018.  Her most recent echocardiogram showing normal LV function with EF of 55 to 60% (3/26/2024).  She is on furosemide 20mg daily, however her aide and legal guardian reports she is not compliant with this and is not taking it daily due to frequency in urination complaints.  I am asking patient to take this medication daily and monitor her weights daily.  She is also to monitor her sodium intake. Will order BNP to evaluate and repeat echocardiogram. Not sure if she will require cardiac MRI to evaluate further for causes of cardiomyopathy. She will follow up with Dr. Moreno for further recommendations on this.    Chest pain, patient reports having atypical chest pain occurring at rest and at times with activity. She has had multiple stress testing which have come back negative for ischemia.  Was recent EKG showing sinus bradycardia with ventricular rate of 56 bpm, borderline prolonged QT interval, and no STEMI.    HTN,  well-controlled, /74 today.  She is on carvedilol 3.125 mg twice daily.    HLD, 6/14/2023 LDL 83, HDL 47, triglycerides 187, she is not on any cholesterol-lowering medications at this time.    LUDA, she is currently not wearing a CPAP.  Referral to sleep medicine provided.    Follow up with Dr. Moreno.    Summer Arzola, APRN-CNP         [1] No family history on file.

## 2025-05-03 LAB
ALBUMIN SERPL-MCNC: 4.1 G/DL (ref 3.6–5.1)
ALP SERPL-CCNC: 31 U/L (ref 31–125)
ALT SERPL-CCNC: 7 U/L (ref 6–29)
ANION GAP SERPL CALCULATED.4IONS-SCNC: 10 MMOL/L (CALC) (ref 7–17)
AST SERPL-CCNC: 14 U/L (ref 10–30)
BILIRUB SERPL-MCNC: 0.4 MG/DL (ref 0.2–1.2)
BNP SERPL-MCNC: NORMAL PG/ML
BUN SERPL-MCNC: 17 MG/DL (ref 7–25)
CALCIUM SERPL-MCNC: 8.9 MG/DL (ref 8.6–10.2)
CHLORIDE SERPL-SCNC: 98 MMOL/L (ref 98–110)
CO2 SERPL-SCNC: 31 MMOL/L (ref 20–32)
CREAT SERPL-MCNC: 1.03 MG/DL (ref 0.5–0.97)
EGFRCR SERPLBLD CKD-EPI 2021: 72 ML/MIN/1.73M2
GLUCOSE SERPL-MCNC: 86 MG/DL (ref 65–139)
POTASSIUM SERPL-SCNC: 4.6 MMOL/L (ref 3.5–5.3)
PROT SERPL-MCNC: 7 G/DL (ref 6.1–8.1)
SODIUM SERPL-SCNC: 139 MMOL/L (ref 135–146)

## 2025-05-05 ENCOUNTER — APPOINTMENT (OUTPATIENT)
Dept: PRIMARY CARE | Facility: CLINIC | Age: 40
End: 2025-05-05
Payer: MEDICAID

## 2025-05-05 LAB
ALBUMIN SERPL-MCNC: 4.1 G/DL (ref 3.6–5.1)
ALP SERPL-CCNC: 31 U/L (ref 31–125)
ALT SERPL-CCNC: 7 U/L (ref 6–29)
ANION GAP SERPL CALCULATED.4IONS-SCNC: 10 MMOL/L (CALC) (ref 7–17)
AST SERPL-CCNC: 14 U/L (ref 10–30)
BILIRUB SERPL-MCNC: 0.4 MG/DL (ref 0.2–1.2)
BNP SERPL-MCNC: 131 PG/ML
BUN SERPL-MCNC: 17 MG/DL (ref 7–25)
CALCIUM SERPL-MCNC: 8.9 MG/DL (ref 8.6–10.2)
CHLORIDE SERPL-SCNC: 98 MMOL/L (ref 98–110)
CO2 SERPL-SCNC: 31 MMOL/L (ref 20–32)
CREAT SERPL-MCNC: 1.03 MG/DL (ref 0.5–0.97)
EGFRCR SERPLBLD CKD-EPI 2021: 72 ML/MIN/1.73M2
GLUCOSE SERPL-MCNC: 86 MG/DL (ref 65–139)
POTASSIUM SERPL-SCNC: 4.6 MMOL/L (ref 3.5–5.3)
PROT SERPL-MCNC: 7 G/DL (ref 6.1–8.1)
SODIUM SERPL-SCNC: 139 MMOL/L (ref 135–146)

## 2025-05-06 ENCOUNTER — OFFICE VISIT (OUTPATIENT)
Dept: SLEEP MEDICINE | Facility: HOSPITAL | Age: 40
End: 2025-05-06
Payer: MEDICAID

## 2025-05-06 VITALS
DIASTOLIC BLOOD PRESSURE: 77 MMHG | BODY MASS INDEX: 36.76 KG/M2 | SYSTOLIC BLOOD PRESSURE: 112 MMHG | OXYGEN SATURATION: 94 % | WEIGHT: 201 LBS | HEART RATE: 77 BPM | TEMPERATURE: 97.2 F

## 2025-05-06 DIAGNOSIS — E66.812 CLASS 2 OBESITY WITH BODY MASS INDEX (BMI) OF 38.0 TO 38.9 IN ADULT, UNSPECIFIED OBESITY TYPE, UNSPECIFIED WHETHER SERIOUS COMORBIDITY PRESENT: ICD-10-CM

## 2025-05-06 DIAGNOSIS — F51.04 CHRONIC INSOMNIA: ICD-10-CM

## 2025-05-06 DIAGNOSIS — G47.33 OSA (OBSTRUCTIVE SLEEP APNEA): Primary | ICD-10-CM

## 2025-05-06 PROCEDURE — 3074F SYST BP LT 130 MM HG: CPT | Performed by: INTERNAL MEDICINE

## 2025-05-06 PROCEDURE — 1036F TOBACCO NON-USER: CPT | Performed by: INTERNAL MEDICINE

## 2025-05-06 PROCEDURE — 99214 OFFICE O/P EST MOD 30 MIN: CPT | Mod: GC | Performed by: INTERNAL MEDICINE

## 2025-05-06 PROCEDURE — 99204 OFFICE O/P NEW MOD 45 MIN: CPT | Performed by: INTERNAL MEDICINE

## 2025-05-06 PROCEDURE — 3078F DIAST BP <80 MM HG: CPT | Performed by: INTERNAL MEDICINE

## 2025-05-06 ASSESSMENT — PAIN SCALES - GENERAL: PAINLEVEL_OUTOF10: 0-NO PAIN

## 2025-05-06 NOTE — PROGRESS NOTES
Patient: Sumanth Aragon    [id number]  : 1985 -- AGE 39 y.o.    Provider: Dionna Smith MD     Location [facility] [place]   Service Date: 2025              [facility]  New Visit Note    ASSESSMENT/PLAN     Ms. Aragon is a 39 y.o. female and She was referred to the [facility] for evaluation of problems listed below on 2025      LUDA Diagnosed with LUDA many yrs ago but not on any treatment. No sleep study result available in chart.   BMI>30, elevated Bicarb so possibility of OHS too.  Plan  In Lab split night PSG ordered with Transcutaneous CO2 monitoring(elevated Bicarb on CMP)    Insomnia  Mainly maintenance. Likely due to untreated sleep apnea, use of diuretic causing nocturia.  Will review symptoms after LUDA testing and treatment.    ?RLS  Atypical, unclear from history.  Will review after LUDA testing.    Problem List, Orders, Assessment, Recommendations:  Problem List Items Addressed This Visit    None  Visit Diagnoses         LUDA (obstructive sleep apnea)    -  Primary    Relevant Orders    In-Center Sleep Study (Sleep Provider Only)      Class 2 obesity with body mass index (BMI) of 38.0 to 38.9 in adult, unspecified obesity type, unspecified whether serious comorbidity present          Chronic insomnia              Disposition  Will call with sleep study results and determine F/U plan       HISTORY OF PRESENT ILLNESS     The patient's referring provider is: Summer Arzola, *; No Assigned PCP Generic Provider, MD    HISTORY OF PRESENT ILLNESS   Sumanth Aragon is a 39 y.o. female with h/o cardiomyopathy, HFpEF, HTN, COPD, insomnia, GERD, generalized anxiety disorder, Depression, explosive personality disorder, hypothyroidism, and cannabis abuse who presents to a [facility] for a sleep medicine evaluation with concerns of New Patient Visit.     Past Sleep History  Patient has the  following sleep-related diagnoses: obstructive sleep apnea. Prior sleep study results: significant for LUDA per patient but not available per chart review.    Current History  History was taken using Sign language interpretor  On today's visit, the patient reports she snores loudly, gasps/chokes for breath and has witnessed apneas for many years. She got PSG done which showed LUDA and was asked to start CPAP but patient never received it. She doesn't remember where and when it was done. She also reports tossing and turning in bed during sleep. She wakes up 4-5 times to use restroom. Sees Cardiology and on furosemide 20mg daily for cardiomyopathy.  On Depakote 250 mg and Trazodone 150 mg.  ECHO 2020 EF 50%.    Sleep schedule:      Weekdays / Work Days Weekends / Days Off   Bedtime 9 pm  same as weekdays   Sleep latency 30 min same as weekdays   Wake time 8 am  same as weekdays   Total sleep time  Average/day:  Total sleep time 10 hours/day Same as weekdays   Naps 1 hr everyday   Nocturnal awakenings Yes, about 4-5 times a night due to nocturia     Preferred sleeping position: SLEEP POSITION: supine    Sleep-related ROS:    Sleep Initiation: no problems going to sleep  Problems going to sleep associated with: No problems going to sleep    Sleep Maintenance: Sleep Maintenance: wakes up about 4-5 times per night due to nocturia  Problems staying asleep associated with: Problems Staying Asleep: nocturia    Breathing during sleep: snoring, witnessed apneas, and gasping/choking for air    RLS screen:  RLSSCREEN: - Sensations: Patient has unusual sensations in their extremities that cause an urge to move them. Tingling only in Right leg.  - Frequency: frequently   - Relief: Symptoms ARE/ARENOT: are not relieved with movement   - Circadian: Symptoms ARE/ARENOT: are not typically improved later in the night.     Sleep-related behaviors: DENIES  hypnagogic/hypnopompic hallucinations  symptoms of cataplexy  sleep  walking  kicking, punching, or acting out dreams  nightmares      Daytime Symptoms  On awakening patient reports: wake unrefreshed and feels sleepy    Patient reports DAYTIME SYMPTOMS: excessively sleepy during the day  Patient denies daytime symptoms including: Denies: excessive daytime sleepiness    ESS:  Not done  TANIA:  Not done  FOSQ: not done      REVIEW OF SYSTEMS     REVIEW OF SYSTEMS  Review of Systems  SLEEP ROS: snoring, choking, periods of not breathing    ALLERGIES AND MEDICATIONS     ALLERGIES  Allergies[1]    MEDICATIONS  Current Medications[2]      PAST HISTORY     PAST MEDICAL HISTORY  She  has a past medical history of Body mass index (BMI) 38.0-38.9, adult (07/15/2019), Encounter for immunization (11/05/2021), Noninfective gastroenteritis and colitis, unspecified (02/18/2016), Pain in left shoulder (11/20/2019), Pain in unspecified knee (11/20/2019), Personal history of other diseases of the respiratory system (05/08/2014), Personal history of other diseases of the respiratory system (11/20/2013), Personal history of other specified conditions (07/11/2018), and Retained wood fragments (03/12/2015).    PAST SURGICAL HISTORY:  Surgical History[3]    FAMILY HISTORY  Family History[4]    She does not have a family history of sleep disorder.    SOCIAL HISTORY  She  reports that she has never smoked. She has never used smokeless tobacco. She reports current alcohol use. She reports current drug use. Drug: Marijuana. She currently lives alone.     Caffeine consumption: No  Alcohol consumption: Yes, sometimes  Smoking: No  Marijuana: Yes    PHYSICAL EXAM     VITAL SIGNS: /77   Pulse 77   Temp 36.2 °C (97.2 °F)   Wt 91.2 kg (201 lb)   SpO2 94%   BMI 36.76 kg/m²      CURRENT WEIGHT:   Vitals:    05/06/25 1410   Weight: 91.2 kg (201 lb)     Body mass index is 36.76 kg/m².  PREVIOUS WEIGHTS:  Wt Readings from Last 3 Encounters:   05/06/25 91.2 kg (201 lb)   04/30/25 95.3 kg (210 lb)   02/13/25 81.6  kg (180 lb)       Physical Exam  PHYSICAL EXAM: GENERAL: alert pleasant and cooperative no acute distress  MODIFIED MALLAMPATI SCORE: IV (only hard palate visible)  TONGUE: normal  EXTREMITIES: warm and well-perfused, without cyanosis, clubbing  NEURO: gait normal    RESULTS/DATA     CARBON DIOXIDE (mmol/L)   Date Value   05/02/2025 31     Bicarbonate (mmol/L)   Date Value   02/13/2025 33 (H)   04/23/2023 34 (H)   07/02/2021 35 (H)   03/16/2021 31     Ferritin (ug/L)   Date Value   10/06/2020 26            PAP Adherence  PAP Download reviewed?: Not applicable.    Patient staffed with Dr Polanco.    Dionna Smith MD  Sleep Medicine Fellow PGY4      Attending Addendum:   NPV. Needs sign language translators x 2. Caregiver present. H/O cardiomyopathy, HFpEF, HTN, COPD, insomnia, GERD, generalized anxiety disorder, Depression, explosive personality disorder, hypothyroidism, and cannabis abuse. LVEF 50% in 2020. C/O SMI and EDS. Will proceed with in-lab PSG with TcCO2 monitoring. RTC 4 months. She verbalized understanding.     Ramesh Polanco MD, FCCP, FAA  Staff Physician  Division of Pulmonary, Critical Care, and Sleep Medicine  [facility]  Clinical Associate Professor of Medicine  [facility]           [1]   Allergies  Allergen Reactions    Ace Inhibitors Unknown   [2]   Current Outpatient Medications   Medication Sig Dispense Refill    acetaminophen (Tylenol) 500 mg tablet Take 1 tablet (500 mg) by mouth every 8 hours if needed.      albuterol 2.5 mg /3 mL (0.083 %) nebulizer solution Inhale 3 mL (2.5 mg) every 4 hours if needed.      albuterol 90 mcg/actuation inhaler Inhale 2 puffs every 4 hours if needed.      albuterol 90 mcg/actuation inhaler Inhale.      Aristada 662 mg/2.4 mL injection Inject 2.4 mL (662 mg) into the muscle every 28 (twenty-eight) days.      busPIRone (Buspar) 15 mg tablet Take 1 tablet (15 mg) by mouth twice a day.      carvedilol  (Coreg) 3.125 mg tablet Take 1 tablet (3.125 mg) by mouth twice a day.      cefuroxime (Ceftin) 500 mg tablet Take 1 tablet (500 mg) by mouth every 12 hours.      cephalexin (Keflex) 500 mg capsule       cetirizine (ZyrTEC) 10 mg tablet Take 1 tablet (10 mg) by mouth.      cholecalciferol (Vitamin D-3) 25 mcg (1,000 units) capsule Take 1 capsule (1,000 Units) by mouth once daily.      clonazePAM (KlonoPIN) 0.5 mg tablet Take 1 tablet (0.5 mg) by mouth once daily.      cyclobenzaprine (Flexeril) 10 mg tablet Take 1 tablet (10 mg) by mouth every 8 hours if needed.      diclofenac sodium (Voltaren) 1 % gel Apply 4.5 inches (4 g) topically 3 times a day as needed.      diphenhydrAMINE (BENADryl) 25 mg capsule Take 1-2 capsules (25-50 mg) by mouth once daily as needed.      divalproex (Depakote) 250 mg EC tablet Take 1 tablet (250 mg) by mouth once daily at bedtime.      doxycycline (Vibramycin) 100 mg capsule TAKE 1 CAPSULE (100 MG) BY MOUTH EVERY 12 HOURS AT 6 AM AND 6 PM FOR 5 DAYS.      FLUoxetine (PROzac) 20 mg capsule Take 2 capsules (40 mg) by mouth once daily.      fluticasone (Flonase) 50 mcg/actuation nasal spray 2 SPRAYS INTO EACH NOSTRIL EVERY MORNING      fluticasone (Flovent HFA) 110 mcg/actuation inhaler Inhale 2 puffs 2 times a day.      furosemide (Lasix) 20 mg tablet Take 1 tablet (20 mg) by mouth once daily.      guaiFENesin (Mucinex) 600 mg 12 hr tablet Take 1 tablet (600 mg) by mouth every 12 hours if needed.      ibuprofen 400 mg tablet Take by mouth.      levothyroxine (Synthroid, Levoxyl) 25 mcg tablet Take 1 tablet (25 mcg) by mouth.      mometasone-formoterol (Dulera 200) 200-5 mcg/actuation inhaler Inhale 2 puffs twice a day.      montelukast (Singulair) 10 mg tablet Take 1 tablet (10 mg) by mouth.      naproxen (Naprosyn) 500 mg tablet       ondansetron ODT (Zofran-ODT) 4 mg disintegrating tablet Dissolve in the mouth.      predniSONE (Deltasone) 10 mg tablet TAKE 4 TABLETS BY MOUTH ONCE DAILY  FOR 5 DAYS.      tiotropium (Spiriva with HandiHaler) 18 mcg inhalation capsule Place into inhaler and inhale once daily.      traZODone (Desyrel) 150 mg tablet 1 TAB BY MOUTH DAILY AT BEDTIME      white petrolatum (Aquaphor Healing) 41 % ointment Apply 1 each topically twice a day.       No current facility-administered medications for this visit.   [3]   Past Surgical History:  Procedure Laterality Date    CT ANGIO NECK  7/7/2020    CT NECK ANGIO W AND WO IV CONTRAST 7/7/2020 [facility] [place] [facility]    CT HEAD ANGIO W AND WO IV CONTRAST  7/7/2020    CT HEAD ANGIO W AND WO IV CONTRAST 7/7/2020 [facility] [place] [facility]    OTHER SURGICAL HISTORY  02/04/2013    Tracheostomy    OTHER SURGICAL HISTORY  02/20/2013    Tracheostomy   [4] No family history on file.

## 2025-05-06 NOTE — PATIENT INSTRUCTIONS
[facility] [place] [facility]\"" Sleep Medicine  [facility] [place]  [address]  [facility] [address]  [phone]    [facility] [place]  [address]  [facility] [address]  [phone]  [facility] [place]  [address]  [facility] 6TH FLOOR  [facility] [address]           NAME: Sumanth Aragon   DATE: 5/6/2025     Your Sleep Provider Today: Ramesh Polanco MD  Your Primary Care Physician: No Assigned PCP Generic Provider, MD   Your Referring Provider: Summer Arzola, *    Thank you for coming to the Sleep Medicine Clinic today! Your sleep medicine provider today was: Ramesh Polanco MD Below is a summary of your treatment plan, other important information, and our contact numbers:  If you need to schedule an appointment, please call [phone]  If you need general assistance (e.g. forms completed, general questions), please call my , Mery, at [phone].  If you have a medical question about your sleep issues, please contact our nurses, Estee or Lee Ann at [phone].   You can also contact us through [employer].      DIAGNOSIS:   1. LUDA (obstructive sleep apnea)  Referral to Adult Sleep Medicine      2. Class 2 obesity with body mass index (BMI) of 38.0 to 38.9 in adult, unspecified obesity type, unspecified whether serious comorbidity present                TREATMENT PLAN     - Get your sleep study scheduled  - If not already done, sign up for 'My Chart' and send prescription requests or messages through this      Instructions - Common LUDA Recs: - Continue weight loss efforts with dietary and exercise measures to minimize the severity of your sleep apnea.  - Avoid alcohol or sedatives several hours prior to sleeping.   - Avoid driving or operating heavy machinery when drowsy. A person driving while sleepy is five (5) times more likely to have an accident. If you  feel sleepy, pull over and take a short power nap (sleep for less than 30 minutes). Otherwise, ask somebody to drive you.    Follow-up Appointment:   Followup after PSG.      IMPORTANT INFORMATION     Call 911 for medical emergencies.  Our offices are generally open from Monday-Friday, 9 am - 5 pm.  If you need to get in touch with me, you may either call me and my team(number is below) or you can use [employer].  If a referral for a test, for CPAP, or for another specialist was made, and you have not heard about scheduling this within a week, please call scheduling at [phone]).  If you are unable to make your appointment for clinic or an overnight study, kindly call the office at least 48 hours in advance to cancel and reschedule.  If you are on CPAP, please bring your device's card or the device to each clinic appointment.   There are no supporting services by either the sleep doctors or their staff on weekends and Holidays, or after 5 PM on weekdays.   If you have been asked to come to a sleep study, make sure you bring toiletries, a comfy pillow, and any nighttime medications that you may regularly take. Also be sure to eat dinner before you arrive. We generally do not provide meals.      PRESCRIPTIONS     We require 7 days advanced notice for prescription refills. If we do not receive the request in this time, we cannot guarantee that your medication will be refilled in time.      IMPORTANT PHONE NUMBERS      scheduling for medical testin549 - [phone]   Sleep Medicine Clinic Fax: [phone]  Appointments (for Pediatric Sleep Clinic): [phone] - option 1  Appointments (for Adult Sleep Clinic): [phone] - option 2  Appointments (For Sleep Studies): [phone] - option 3  Financial Assistance Program: Call [phone] (For [facility] services: call [phone])  Website:  Financial Assistance  Behavioral Sleep Medicine: [phone]  Bariatric  "Surgery: [phone] ( Bariatric Surgery Website)   Sleep Surgery: [phone]  ENT (Otolaryngology): [phone]  Myofunctional Therapy (ENT): JANNETH Donis, Chi Hernandez, Vicente Ellington; [phone]   Andrew Haley; [phone]  Alexi Florez; [phone]  [facility]; [phone]  [facility] [phone] (option 1)  Headache Clinic (Neurology): [phone]  Neurology: [phone]  Psychiatry: [phone]  Pulmonary Function Testing (PFT) Center: [phone]  Pulmonary Medicine: [phone]  [employer] (DME): [phone]  [employer] (DME): [phone]  [facility] (DME): [phone]      COMMON PROVIDERS WE REFER TO     For Weight Loss - Dr. Adrián Garcia - Call [phone]  For Sleep Surgery - Dr. Lázaro Prado - Call [phone]      OUR ADULT SLEEP MEDICINE TEAM   Please do not hesitate to call the office or sleep nurse with any questions between appointments:    Adult Sleep Nurses (Lee Ann Domínguez, RN and Alexandra Macario RN):  For clinical questions and refilling prescriptions: [phone]  Email sleep diaries and other documents at: [email or web address]    My :  Quyen Burnett   P: [phone]  F: [phone]      CONTACTING YOUR SLEEP MEDICINE PROVIDER     Send a message directly to your provider through \"My Chart\", which is the email service through your  Records Account: [email or web address]   Call [phone] and leave a message. One of the administrative assistants will forward the message to your sleep medicine provider through \"My Chart\" and/or email.     Your sleep medicine provider for this visit was: Ramesh Polanco MD        "

## 2025-05-21 ENCOUNTER — HOSPITAL ENCOUNTER (OUTPATIENT)
Dept: CARDIOLOGY | Facility: CLINIC | Age: 40
Discharge: HOME | End: 2025-05-21
Payer: MEDICAID

## 2025-05-21 DIAGNOSIS — I50.32 CHRONIC HEART FAILURE WITH PRESERVED EJECTION FRACTION: ICD-10-CM

## 2025-05-21 DIAGNOSIS — I42.9 CARDIOMYOPATHY, UNSPECIFIED TYPE (MULTI): ICD-10-CM

## 2025-05-21 DIAGNOSIS — R06.02 SHORTNESS OF BREATH: ICD-10-CM

## 2025-05-21 PROCEDURE — 93306 TTE W/DOPPLER COMPLETE: CPT

## 2025-05-21 PROCEDURE — 93306 TTE W/DOPPLER COMPLETE: CPT | Performed by: STUDENT IN AN ORGANIZED HEALTH CARE EDUCATION/TRAINING PROGRAM

## 2025-05-22 LAB
AORTIC VALVE MEAN GRADIENT: 4 MMHG
AORTIC VALVE PEAK VELOCITY: 1.41 M/S
AV PEAK GRADIENT: 8 MMHG
AVA (PEAK VEL): 1.7 CM2
AVA (VTI): 1.67 CM2
EJECTION FRACTION APICAL 4 CHAMBER: 52.8
EJECTION FRACTION: 58 %
LEFT VENTRICLE INTERNAL DIMENSION DIASTOLE: 4.91 CM (ref 3.5–6)
LEFT VENTRICULAR OUTFLOW TRACT DIAMETER: 1.87 CM
MITRAL VALVE E/A RATIO: 1.63
RIGHT VENTRICLE FREE WALL PEAK S': 10 CM/S
RIGHT VENTRICLE PEAK SYSTOLIC PRESSURE: 31.8 MMHG
TRICUSPID ANNULAR PLANE SYSTOLIC EXCURSION: 2.1 CM

## 2025-07-09 PROBLEM — E66.812 CLASS 2 OBESITY WITH BODY MASS INDEX (BMI) OF 36.0 TO 36.9 IN ADULT: Status: ACTIVE | Noted: 2018-06-13

## 2025-07-09 PROBLEM — H91.90 HEARING LOSS: Status: RESOLVED | Noted: 2024-06-02 | Resolved: 2025-07-09

## 2025-07-30 ENCOUNTER — OFFICE VISIT (OUTPATIENT)
Dept: CARDIOLOGY | Facility: CLINIC | Age: 40
End: 2025-07-30
Payer: MEDICAID

## 2025-07-30 VITALS
OXYGEN SATURATION: 93 % | HEART RATE: 60 BPM | DIASTOLIC BLOOD PRESSURE: 68 MMHG | BODY MASS INDEX: 36.51 KG/M2 | WEIGHT: 199.6 LBS | SYSTOLIC BLOOD PRESSURE: 112 MMHG

## 2025-07-30 DIAGNOSIS — I50.32 CHRONIC HEART FAILURE WITH PRESERVED EJECTION FRACTION: ICD-10-CM

## 2025-07-30 DIAGNOSIS — I42.9 CARDIOMYOPATHY, UNSPECIFIED TYPE (MULTI): Primary | ICD-10-CM

## 2025-07-30 DIAGNOSIS — R06.02 SHORTNESS OF BREATH: ICD-10-CM

## 2025-07-30 DIAGNOSIS — I10 PRIMARY HYPERTENSION: ICD-10-CM

## 2025-07-30 LAB
ATRIAL RATE: 64 BPM
P AXIS: 57 DEGREES
P OFFSET: 210 MS
P ONSET: 155 MS
PR INTERVAL: 138 MS
Q ONSET: 224 MS
QRS COUNT: 10 BEATS
QRS DURATION: 84 MS
QT INTERVAL: 466 MS
QTC CALCULATION(BAZETT): 480 MS
QTC FREDERICIA: 476 MS
R AXIS: 38 DEGREES
T AXIS: 260 DEGREES
T OFFSET: 457 MS
VENTRICULAR RATE: 64 BPM

## 2025-07-30 PROCEDURE — 3074F SYST BP LT 130 MM HG: CPT | Performed by: STUDENT IN AN ORGANIZED HEALTH CARE EDUCATION/TRAINING PROGRAM

## 2025-07-30 PROCEDURE — 93005 ELECTROCARDIOGRAM TRACING: CPT | Performed by: STUDENT IN AN ORGANIZED HEALTH CARE EDUCATION/TRAINING PROGRAM

## 2025-07-30 PROCEDURE — 99212 OFFICE O/P EST SF 10 MIN: CPT | Mod: 25 | Performed by: STUDENT IN AN ORGANIZED HEALTH CARE EDUCATION/TRAINING PROGRAM

## 2025-07-30 PROCEDURE — 99204 OFFICE O/P NEW MOD 45 MIN: CPT | Performed by: STUDENT IN AN ORGANIZED HEALTH CARE EDUCATION/TRAINING PROGRAM

## 2025-07-30 PROCEDURE — 93010 ELECTROCARDIOGRAM REPORT: CPT | Performed by: STUDENT IN AN ORGANIZED HEALTH CARE EDUCATION/TRAINING PROGRAM

## 2025-07-30 PROCEDURE — 3078F DIAST BP <80 MM HG: CPT | Performed by: STUDENT IN AN ORGANIZED HEALTH CARE EDUCATION/TRAINING PROGRAM

## 2025-07-30 RX ORDER — TIOTROPIUM BROMIDE INHALATION SPRAY 3.12 UG/1
2 SPRAY, METERED RESPIRATORY (INHALATION) DAILY
COMMUNITY
Start: 2025-06-30

## 2025-07-30 RX ORDER — BUDESONIDE AND FORMOTEROL FUMARATE DIHYDRATE 160; 4.5 UG/1; UG/1
2 AEROSOL RESPIRATORY (INHALATION)
COMMUNITY
Start: 2025-07-15

## 2025-07-30 RX ORDER — IBUPROFEN 600 MG/1
1 TABLET, FILM COATED ORAL EVERY 6 HOURS PRN
COMMUNITY
Start: 2025-06-01

## 2025-07-30 RX ORDER — PAROXETINE 10 MG/1
10 TABLET, FILM COATED ORAL
COMMUNITY
Start: 2025-07-14

## 2025-07-30 RX ORDER — DIVALPROEX SODIUM 500 MG/1
500 TABLET, DELAYED RELEASE ORAL NIGHTLY
COMMUNITY
Start: 2025-07-29

## 2025-07-30 RX ORDER — ARIPIPRAZOLE LAUROXIL 882 MG/3.2ML
882 INJECTION, SUSPENSION, EXTENDED RELEASE INTRAMUSCULAR
COMMUNITY
Start: 2025-07-11

## 2025-07-30 RX ORDER — LIDOCAINE 50 MG/G
1 PATCH TOPICAL DAILY
COMMUNITY
Start: 2025-07-01

## 2025-07-30 RX ORDER — IPRATROPIUM BROMIDE AND ALBUTEROL SULFATE 2.5; .5 MG/3ML; MG/3ML
3 SOLUTION RESPIRATORY (INHALATION) AS NEEDED
COMMUNITY
Start: 2025-07-15

## 2025-07-30 NOTE — PROGRESS NOTES
Referred by Dr. Arzola for Hypertension, Cardiomyopathy, and Heart Failure (3 month follow up(saw Hermila in April)/New to you)     History Of Present Illness:    Sumanth Aragon is a 39 y.o. female past med history notable for cardiomyopathy possibly hypertensive related previous ejection fraction 40% recovered to 55% 2018 with repeat echo showing 55 to 60%, hypertension, hyperlipidemia, COPD, deafness, generalized anxiety depression disorder PTSD COPD as well as asthma who is also here to establish care.  She was seen by our colleague 3 months ago and was ordered an echocardiogram at that time.  She has had on and off episodes of chest pain.  The chest pain seems to be positional nature and not exertional.  Notably she does workout lift weights and does chest exercises.  She has had bilateral lower extreme edema most pronounced on the right leg.  She takes Lasix 20 mg with incomplete compliance.  Baseline ECG today shows sinus rhythm normal axis with T wave inversions in leads II to III and aVF seen with previous EKGs.      Past Medical History:  She has a past medical history of Body mass index (BMI) 38.0-38.9, adult (07/15/2019), Encounter for immunization (11/05/2021), Noninfective gastroenteritis and colitis, unspecified (02/18/2016), Pain in left shoulder (11/20/2019), Pain in unspecified knee (11/20/2019), Personal history of other diseases of the respiratory system (05/08/2014), Personal history of other diseases of the respiratory system (11/20/2013), Personal history of other specified conditions (07/11/2018), and Retained wood fragments (03/12/2015).    Past Surgical History:  She has a past surgical history that includes Other surgical history (02/04/2013); Other surgical history (02/20/2013); CT angio neck (7/7/2020); and CT angio head w and wo IV contrast (7/7/2020).      Social History:  She reports that she has never smoked. She has never used smokeless tobacco. She reports current alcohol use. She  reports current drug use. Drug: Marijuana.    Family History:  Family History[1]     Allergies:  Ace inhibitors    Outpatient Medications:  Current Outpatient Medications   Medication Instructions    acetaminophen (TYLENOL) 500 mg, Every 8 hours PRN    albuterol 90 mcg/actuation inhaler 2 puffs, Every 4 hours PRN    albuterol 2.5 mg, Every 4 hours PRN    Aristada 882 mg, Every 28 days    busPIRone (Buspar) 15 mg tablet 1 tablet, 2 times daily    carvedilol (COREG) 3.125 mg, 2 times daily    cetirizine (ZYRTEC) 10 mg    cholecalciferol (VITAMIN D-3) 1,000 Units, Daily RT    clonazePAM (KLONOPIN) 0.5 mg, Daily RT    cyclobenzaprine (FLEXERIL) 10 mg, Every 8 hours PRN    diclofenac sodium (VOLTAREN) 4 g, 3 times daily PRN    diphenhydrAMINE (BENADRYL) 25-50 mg, Daily PRN    divalproex (DEPAKOTE) 500 mg, Nightly    FLUoxetine (PROZAC) 40 mg, Daily RT    fluticasone (Flonase) 50 mcg/actuation nasal spray 2 SPRAYS INTO EACH NOSTRIL EVERY MORNING    fluticasone (Flovent HFA) 110 mcg/actuation inhaler 2 puffs, 2 times daily    furosemide (LASIX) 40 mg, oral, Daily RT    guaiFENesin (MUCINEX) 600 mg, Every 12 hours PRN    ibuprofen 600 mg tablet 1 tablet, Every 6 hours PRN    ipratropium-albuteroL (Duo-Neb) 0.5-2.5 mg/3 mL nebulizer solution 3 mL, As needed    levothyroxine (SYNTHROID, LEVOXYL) 25 mcg    lidocaine (Lidoderm) 5 % patch 1 patch, Daily    mometasone-formoterol (Dulera 200) 200-5 mcg/actuation inhaler 2 puffs, 2 times daily    montelukast (SINGULAIR) 10 mg    naproxen (Naprosyn) 500 mg tablet     ondansetron ODT (Zofran-ODT) 4 mg disintegrating tablet Dissolve in the mouth.    PARoxetine (PAXIL) 10 mg, Daily before breakfast    Spiriva Respimat 2.5 mcg/actuation inhaler 2 puffs, Daily    Symbicort 160-4.5 mcg/actuation inhaler 2 puffs, 2 times daily RT    tiotropium (Spiriva with HandiHaler) 18 mcg inhalation capsule Daily RT    traZODone (Desyrel) 150 mg tablet 1 TAB BY MOUTH DAILY AT BEDTIME        Last  Recorded Vitals:  Vitals:    07/30/25 1100   BP: 112/68   BP Location: Left arm   Patient Position: Sitting   BP Cuff Size: Adult   Pulse: 60   SpO2: 93%   Weight: 90.5 kg (199 lb 9.6 oz)       Physical Exam:  General: No acute distress, deaf  Skin: Warm and dry  Neck: JVD is not elevated  ENT: Moist mucous membranes no lesions appreciated  Pulmonary: CTAB  Cards: Regular rate rhythm, no murmurs gallops or rubs appreciated normal S1-S2  Abdomen: Soft nontender nondistended  Extremities: Trace to 1+ edema noted bilaterally with the right greater than left  Psych: Appropriate mood and affect     @PESEC->PESEC(CIRCULAR REFERENCE)@       Last Labs:  CBC -  Lab Results   Component Value Date    WBC 7.3 02/13/2025    HGB 12.5 02/13/2025    HCT 39.0 02/13/2025    MCV 89 02/13/2025     (L) 02/13/2025       CMP -  Lab Results   Component Value Date    CALCIUM 8.9 05/02/2025    PROT 7.0 05/02/2025    ALBUMIN 4.1 05/02/2025    AST 14 05/02/2025    ALT 7 05/02/2025    ALKPHOS 31 05/02/2025    BILITOT 0.4 05/02/2025       LIPID PANEL -   Lab Results   Component Value Date    CHOL 192 03/16/2021    TRIG 111 03/16/2021    HDL 39.9 (A) 03/16/2021    CHHDL 4.8 03/16/2021    LDLF 130 (H) 03/16/2021    VLDL 22 03/16/2021    NHDL 139 10/06/2020       RENAL FUNCTION PANEL -   Lab Results   Component Value Date    GLUCOSE 86 05/02/2025     05/02/2025    K 4.6 05/02/2025    CL 98 05/02/2025    CO2 31 05/02/2025    ANIONGAP 10 05/02/2025    BUN 17 05/02/2025    CREATININE 1.03 (H) 05/02/2025    CALCIUM 8.9 05/02/2025    ALBUMIN 4.1 05/02/2025        Lab Results   Component Value Date     (H) 05/02/2025    HGBA1C 5.5 06/14/2023       Last Cardiology Tests:  ECG:  No results found for this or any previous visit from the past 1095 days.    Echo:  Transthoracic Echo (TTE) Complete 05/21/2025    Ejection Fractions:  EF   Date/Time Value Ref Range Status   05/21/2025 12:15 PM 58 %      Assessment/Plan     1.  Heart failure  recovered ejection fraction: Previous ejection fraction 40% which recovered to 55%.  Most recent echo shows 55 to 60% LV function with elevated RVSP.  Etiology unclear.  Previous stress imaging negative for ischemia.  Unclear if this was stress-induced cardiomyopathy versus hypertensive heart disease.  - Mild heart failure symptoms today.    - Increase Lasix to 40 mg daily  - Check BMP/BNP within 1 to 2 weeks  - Continue carvedilol 3.125 mg twice a day  - May benefit from the addition of spironolactone if volume status is not maintained with loop diuretics    2.  Hypertension: Blood pressures are well-controlled today with current medical therapy.    3.  Atypical chest pain: Appears positional in nature and not related to exertion.  Baseline ECG reviewed.  EKG shows sinus rhythm she has T wave changes in leads II to III and aVF unchanged compared to previous assessment.  Previous stress testing from 2023 reviewed showing no evidence of ischemia.  For now we will monitor.  Reassess at next appointment.  Consider coronary CTA if symptoms persist and concerns for angina equivalent.    Follow-up in 6 months    (This note was generated with voice recognition software and may contain errors including spelling, grammar, syntax and missed recognition of what was dictated, of which may not have been fully corrected)     Chase Moreno MD PhD       [1] No family history on file.

## 2025-08-11 ENCOUNTER — APPOINTMENT (OUTPATIENT)
Dept: CARDIOLOGY | Facility: CLINIC | Age: 40
End: 2025-08-11
Payer: MEDICAID